# Patient Record
Sex: MALE | Race: WHITE | NOT HISPANIC OR LATINO | Employment: FULL TIME | URBAN - METROPOLITAN AREA
[De-identification: names, ages, dates, MRNs, and addresses within clinical notes are randomized per-mention and may not be internally consistent; named-entity substitution may affect disease eponyms.]

---

## 2023-03-27 ENCOUNTER — APPOINTMENT (OUTPATIENT)
Dept: RADIOLOGY | Facility: MEDICAL CENTER | Age: 18
End: 2023-03-27
Attending: EMERGENCY MEDICINE
Payer: COMMERCIAL

## 2023-03-27 ENCOUNTER — HOSPITAL ENCOUNTER (EMERGENCY)
Facility: MEDICAL CENTER | Age: 18
End: 2023-03-27
Attending: EMERGENCY MEDICINE
Payer: COMMERCIAL

## 2023-03-27 VITALS
DIASTOLIC BLOOD PRESSURE: 59 MMHG | HEIGHT: 76 IN | SYSTOLIC BLOOD PRESSURE: 142 MMHG | OXYGEN SATURATION: 97 % | TEMPERATURE: 97.8 F | WEIGHT: 199.3 LBS | HEART RATE: 72 BPM | BODY MASS INDEX: 24.27 KG/M2 | RESPIRATION RATE: 16 BRPM

## 2023-03-27 DIAGNOSIS — S60.212A CONTUSION OF LEFT WRIST, INITIAL ENCOUNTER: ICD-10-CM

## 2023-03-27 DIAGNOSIS — S60.222A CONTUSION OF LEFT HAND, INITIAL ENCOUNTER: ICD-10-CM

## 2023-03-27 PROCEDURE — 700102 HCHG RX REV CODE 250 W/ 637 OVERRIDE(OP): Performed by: EMERGENCY MEDICINE

## 2023-03-27 PROCEDURE — 73130 X-RAY EXAM OF HAND: CPT | Mod: LT

## 2023-03-27 PROCEDURE — 73100 X-RAY EXAM OF WRIST: CPT | Mod: LT

## 2023-03-27 PROCEDURE — A9270 NON-COVERED ITEM OR SERVICE: HCPCS | Performed by: EMERGENCY MEDICINE

## 2023-03-27 PROCEDURE — 99284 EMERGENCY DEPT VISIT MOD MDM: CPT

## 2023-03-27 RX ORDER — IBUPROFEN 600 MG/1
600 TABLET ORAL ONCE
Status: COMPLETED | OUTPATIENT
Start: 2023-03-27 | End: 2023-03-27

## 2023-03-27 RX ORDER — IBUPROFEN 800 MG/1
800 TABLET ORAL EVERY 8 HOURS PRN
Qty: 30 TABLET | Refills: 0 | Status: SHIPPED | OUTPATIENT
Start: 2023-03-27 | End: 2023-06-23

## 2023-03-27 RX ADMIN — IBUPROFEN 600 MG: 600 TABLET, FILM COATED ORAL at 20:49

## 2023-03-27 NOTE — LETTER
"  FORM C-4:  EMPLOYEE’S CLAIM FOR COMPENSATION/ REPORT OF INITIAL TREATMENT  EMPLOYEE’S CLAIM - PROVIDE ALL INFORMATION REQUESTED   First Name Prosper Last Name Nigel Birthdate 2005  Sex male Claim Number   Home Address [facility] [address]   [place]             Zip [address]                                   Age  17 y.o. Height  1.93 m (6' 4\") (99 %, Z= 2.31, Source: CDC (Boys, 2-20 Years)) Weight  90.4 kg (199 lb 4.7 oz) (92 %, Z= 1.44, Source: CDC (Boys, 2-20 Years)) [facility]  [id number]   Mailing Address [address]  [place]              Zip [address] Telephone  [phone] (home)  Primary Language Spoken  English   Insurer   Third Party   [employer] Employee's Occupation (Job Title) When Injury or Occupational Disease Occurred     Employer's Name [employer] Telephone [phone]    Employer Address [address] [facility] [place] [facility] [address]] Zip [address]   Date of Injury  3/27/2023       Hour of Injury  8:30 AM Date Employer Notified  3/27/2023 Last Day of Work after Injury or Occupational Disease  3/27/2023 Supervisor to Whom Injury Reported  Koffi   Address or Location of Accident (if applicable) Work [1]   What were you doing at the time of accident? (if applicable) N/A    How did this injury or occupational disease occur? Be specific and answer in detail. Use additional sheet if necessary)  Placed me in a area that wasnt part of the zone or a official station of the line and a conveyor that raises with no signs or warning crushed my hand as I was doing my work.   If you believe that you have an occupational disease, when did you first have knowledge of the disability and it relationship to your employment? N/A Witnesses to the Accident  N/A   Nature of Injury or Occupational Disease  Workers' Compensation Part(s) of Body Injured or Affected  Hand (L), N/A, N/A    I CERTIFY THAT THE ABOVE IS TRUE AND CORRECT TO THE BEST OF MY " KNOWLEDGE AND THAT I HAVE PROVIDED THIS INFORMATION IN ORDER TO OBTAIN THE BENEFITS OF [place] IN[place] OR CHAPTER 617 OF [place]).  I HEREBY AUTHORIZE ANY PHYSICIAN, CHIROPRACTOR, SURGEON, PRACTITIONER, OR OTHER PERSON, ANY HOSPITAL, INCLUDING [facility] OR [facility], ANY MEDICAL SERVICE ORGANIZATION, ANY INSURANCE COMPANY, OR OTHER INSTITUTION OR ORGANIZATION TO RELEASE TO EACH OTHER, ANY MEDICAL OR OTHER INFORMATION, INCLUDING BENEFITS PAID OR PAYABLE, PERTINENT TO THIS INJURY OR DISEASE, EXCEPT INFORMATION RELATIVE TO DIAGNOSIS, TREATMENT AND/OR COUNSELING FOR AIDS, PSYCHOLOGICAL CONDITIONS, ALCOHOL OR CONTROLLED SUBSTANCES, FOR WHICH I MUST GIVE SPECIFIC AUTHORIZATION.  A PHOTOSTAT OF THIS AUTHORIZATION SHALL BE AS VALID AS THE ORIGINAL.  Date   03/27/2023        [facility]              Employee’s Signature   THIS REPORT MUST BE COMPLETED AND MAILED WITHIN 3 WORKING DAYS OF TREATMENT   [facility], EMERGENCY DEPT                       Name of Facility [facility]   Date  3/27/2023 Diagnosis  (S60.222A) Contusion of left hand, initial encounter  (S60.212A) Contusion of left wrist, initial encounter Is there evidence the injured employee was under the influence of alcohol and/or another controlled substance at the time of accident?   Hour  8:38 PM Description of Injury or Disease  Contusion of left hand, initial encounter  Contusion of left wrist, initial encounter     Treatment  Wrist splint and Motrin  Have you advised the patient to remain off work five days or more?         No   X-Ray Findings  Negative If Yes   From Date    To Date      From information given by the employee, together with medical evidence, can you directly connect this injury or occupational disease as job incurred? Yes If No, is employee capable of: Full Duty  No Modified  "Duty  Yes   Is additional medical care by a physician indicated? Yes If Modified Duty, Specify any Limitations / Restrictions   No use of left hand or wrist until seen by Workmen's Comp.   Do you know of any previous injury or disease contributing to this condition or occupational disease? No    Date 3/27/2023 Print Doctor’s Name Dax Ayala I certify the employer’s copy of this form was mailed on:   Address [address] [place] [address] [phone] INSURER’S USE ONLY   Provider’s Tax ID Number [id number] Telephone Dept: [phone]    Doctor’s Signature e-DAX Jean-Baptiste D.O. Degree  D.O.      Form C-4 (rev.10/07)                                                           BRIEF DESCRIPTION OF RIGHTS AND BENEFITS  (Pursuant to [place]RS 616C.050)    Notice of Injury or Occupational Disease (Incident Report Form C-1): If an injury or occupational disease (OD) arises out of and in the course of employment, you must provide written notice to your employer as soon as practicable, but no later than 7 days after the accident or OD. Your employer shall maintain a sufficient supply of the required forms.    Claim for Compensation (Form C-4): If medical treatment is sought, the form C-4 is available at the place of initial treatment. A completed \"Claim for Compensation\" (Form C-4) must be filed within 90 days after an accident or OD. The treating physician or chiropractor must, within 3 working days after treatment, complete and mail to the employer, the employer's insurer and third-party , the Claim for Compensation.    Medical Treatment: If you require medical treatment for your on-the-job injury or OD, you may be required to select a physician or chiropractor from a list provided by your workers’ compensation insurer, if it has contracted with an Organization for Managed Care (MCO) or Preferred Provider Organization (PPO) or providers of health care. If your employer has not entered into a " contract with an MCO or PPO, you may select a physician or chiropractor from the Panel of Physicians and Chiropractors. Any medical costs related to your industrial injury or OD will be paid by your insurer.    Temporary Total Disability (TTD): If your doctor has certified that you are unable to work for a period of at least 5 consecutive days, or 5 cumulative days in a 20-day period, or places restrictions on you that your employer does not accommodate, you may be entitled to TTD compensation.    Temporary Partial Disability (TPD): If the wage you receive upon reemployment is less than the compensation for TTD to which you are entitled, the insurer may be required to pay you TPD compensation to make up the difference. TPD can only be paid for a maximum of 24 months.    Permanent Partial Disability (PPD): When your medical condition is stable and there is an indication of a PPD as a result of your injury or OD, within 30 days, your insurer must arrange for an evaluation by a rating physician or chiropractor to determine the degree of your PPD. The amount of your PPD award depends on the date of injury, the results of the PPD evaluation, your age and wage.    Permanent Total Disability (PTD): If you are medically certified by a treating physician or chiropractor as permanently and totally disabled and have been granted a PTD status by your insurer, you are entitled to receive monthly benefits not to exceed 66 2/3% of your average monthly wage. The amount of your PTD payments is subject to reduction if you previously received a lump-sum PPD award.    Vocational Rehabilitation Services: You may be eligible for vocational rehabilitation services if you are unable to return to the job due to a permanent physical impairment or permanent restrictions as a result of your injury or occupational disease.    Transportation and Per Mann Reimbursement: You may be eligible for travel expenses and per mann associated with medical  treatment.    Reopening: You may be able to reopen your claim if your condition worsens after claim closure.     Appeal Process: If you disagree with a written determination issued by the insurer or the insurer does not respond to your request, you may appeal to the Department of Administration, , by following the instructions contained in your determination letter. You must appeal the determination within 70 days from the date of the determination letter at [address], [address], [place], [place] [address], or [address], [place], [place] [address]. If you disagree with the  decision, you may appeal to the Department of Administration, . You must file your appeal within 30 days from the date of the  decision letter at [address], [address], [place], [place] [address], or [address], [place], [place] [address]. If you disagree with a decision of an , you may file a petition for judicial review with the District Court. You must do so within 30 days of the Appeal Officer’s decision. You may be represented by an  at your own expense or you may contact the [facility] for possible representation.    [place]  for Injured Workers (NAIW): If you disagree with a  decision, you may request that NAIW represent you without charge at an  Hearing. For information regarding denial of benefits, you may contact the [facility] at: [address], [address], [place] [address], [phone], or [address], [address], [place] [address], [phone]    To File a Complaint with the Division: If you wish to file a complaint with the  of the Division of Industrial Relations (DIR),  please contact the Workers’ Compensation Section, [address], [address], [place], [place] [address], telephone [phone], or [address], [address], [place], [place] [address], telephone [phone].    For assistance with Workers’ Compensation Issues: You may contact the [facility] Office for Consumer Health Assistance, [facility], [address], [place], [place] [address], Toll Free 1-145.611.5542, Web site: http://[facility].nv.gov/Programs/LILY E-mail: lily@[facility].nv.gov  D-2 (rev. 10/20)                  __________________________________________________________________                                    _________________            Employee Name / Signature                                                                                                                            Date

## 2023-03-28 NOTE — ED TRIAGE NOTES
"Chief Complaint   Patient presents with    Hand Pain     Pt crushed left hand under machine at work at 8 am this morning. Pt complaining of 7/10 pain to left hand and wrist. +swelling, CMS intact.        16 yo M to triage for above complaint. XRAY ordered.      Pt placed in lobby. Pt educated on triage process. Pt encouraged to alert staff for any changes.     Patient and staff wearing appropriate PPE    BP (!) 140/60   Pulse 71   Temp 36.7 °C (98.1 °F) (Temporal)   Resp 16   Ht 1.93 m (6' 4\")   Wt 90.4 kg (199 lb 4.7 oz)   SpO2 98%   BMI 24.26 kg/m²     "

## 2023-03-28 NOTE — ED PROVIDER NOTES
"ED Provider Note    CHIEF COMPLAINT  Chief Complaint   Patient presents with    Hand Pain     Pt crushed left hand under machine at work at 8 am this morning. Pt complaining of 7/10 pain to left hand and wrist. +swelling, CMS intact.        EXTERNAL RECORDS REVIEWED  None    HPI/ROS  LIMITATION TO HISTORY   None  OUTSIDE HISTORIAN(S):  None    Prosper Manning is a 17 y.o. male who presents here for evaluation of left wrist and hand pain.  Patient states he was at work, and he was on a conveyor belt station, when the belt pulled his hand in towards the machine, but he was able to pull the hand out.  He sustained a hand and wrist injury on the left side.  Nothing fell onto his hand.  He did this earlier this morning, but came here for further evaluation.  He has no fever chills or vomiting, and no other reported injuries.  He has not taken anything prior to arrival for any pain or discomfort.    PAST MEDICAL HISTORY   No bleeding disorders    SURGICAL HISTORY  patient denies any surgical history    FAMILY HISTORY  History reviewed. No pertinent family history.    SOCIAL HISTORY  Social History     Tobacco Use    Smoking status: Never    Smokeless tobacco: Never   Vaping Use    Vaping Use: Never used   Substance and Sexual Activity    Alcohol use: Never    Drug use: Never    Sexual activity: Not on file       CURRENT MEDICATIONS  Home Medications       Reviewed by Sandra Dickens RMARI (Registered Nurse) on 03/27/23 at 1954  Med List Status: Not Addressed     Medication Last Dose Status        Patient Sen Taking any Medications                           ALLERGIES  No Known Allergies    PHYSICAL EXAM  VITAL SIGNS: BP (!) 140/60   Pulse 71   Temp 36.7 °C (98.1 °F) (Temporal)   Resp 16   Ht 1.93 m (6' 4\")   Wt 90.4 kg (199 lb 4.7 oz)   SpO2 98%   BMI 24.26 kg/m²    Constitutional: Well developed, well nourished. No acute distress.  HEENT: Normocephalic, atraumatic. Posterior pharynx clear and moist.  Eyes:  EOMI. " Normal sclera.  Neck: Supple, Full range of motion, nontender.  Musculoskeletal: No deformity, no edema, neurovascular intact.  Left upper extremity; tenderness to the distal lateral wrist, and palmar aspect of the hand.  Neurovascular intact distally.  Good cap refill.  Nontender left elbow, all soft compartments.  Neuro: Clear speech, appropriate, cooperative, cranial nerves II-XII grossly intact.  Psych: Normal mood and affect      DIAGNOSTIC STUDIES / PROCEDURES  None    RADIOLOGY  I have independently interpreted the diagnostic imaging associated with this visit and am waiting the final reading from the radiologist.   My preliminary interpretation is as follows: See below  Radiologist interpretation:   DX-WRIST-LIMITED 2- LEFT   Final Result         1.  No radiographic evidence of acute traumatic injury.      DX-HAND 3+ LEFT   Final Result         1.  No acute traumatic bony injury.            COURSE & MEDICAL DECISION MAKING  None    INITIAL ASSESSMENT, COURSE AND PLAN  Care Narrative: This is a 17-year-old male here for evaluation of left wrist and hand pain.  Patient had an incident at work where his hand was pulled into the conveyor belt area, and pulled upward.  He did not sustain any type of crush injury.  He has no acute finding on x-ray, and is neurovascular intact distally.  He will be placed in a wrist splint, and he will follow-up with Workmen's Comp.        DISPOSITION AND DISCUSSIONS  I have discussed management of the patient with the following physicians and ERIKA's: None    Discussion of management with other [facility] or appropriate source(s): None    Escalation of care considered, and ultimately not performed: None    Barriers to care at this time, including but not limited to: None.     Decision tools and prescription drugs considered including, but not limited to: None.    FINAL DIAGNOSIS  Left wrist contusion  Left hand contusion       Electronically signed by: Dax Ayala D.O., 3/27/2023  8:22 PM

## 2023-06-23 ENCOUNTER — OCCUPATIONAL MEDICINE (OUTPATIENT)
Dept: URGENT CARE | Facility: CLINIC | Age: 18
End: 2023-06-23
Payer: COMMERCIAL

## 2023-06-23 VITALS
HEIGHT: 76 IN | WEIGHT: 200 LBS | DIASTOLIC BLOOD PRESSURE: 70 MMHG | HEART RATE: 71 BPM | OXYGEN SATURATION: 97 % | RESPIRATION RATE: 12 BRPM | TEMPERATURE: 98.7 F | BODY MASS INDEX: 24.36 KG/M2 | SYSTOLIC BLOOD PRESSURE: 110 MMHG

## 2023-06-23 DIAGNOSIS — S66.912D HAND STRAIN, LEFT, SUBSEQUENT ENCOUNTER: ICD-10-CM

## 2023-06-23 PROCEDURE — 3074F SYST BP LT 130 MM HG: CPT | Performed by: NURSE PRACTITIONER

## 2023-06-23 PROCEDURE — 3078F DIAST BP <80 MM HG: CPT | Performed by: NURSE PRACTITIONER

## 2023-06-23 PROCEDURE — 99203 OFFICE O/P NEW LOW 30 MIN: CPT | Performed by: NURSE PRACTITIONER

## 2023-06-23 ASSESSMENT — ENCOUNTER SYMPTOMS: TINGLING: 0

## 2023-06-23 NOTE — LETTER
[facility] [address] [place] [address]  Phone:  [phone] - Fax:  [phone]   [facility] Progress Report and Disability Certification  Date of Service: 6/23/2023   No Show:  No  Date / Time of Next Visit: 6/30/2023   Claim Information   Patient Name: Prosper Manning  Claim Number:     Employer: [employer]  Date of Injury: 3/27/2023     Insurer / TPA: [employer]  ID / SSN:     Occupation:   Diagnosis: The encounter diagnosis was Hand strain, left, subsequent encounter.    Medical Information   Related to Industrial Injury? Yes    Subjective Complaints:  DOI: 3/27/23: Patient is a 17-year-old male who returns to the urgent care for reevaluation of left hand discomfort that is been exacerbated while at work.  On date of injury patient was injured with a crushing injury to the left hand was not initially evaluated in the emergency room, x-rays were performed which were negative for any acute fractures.  He was provided a brace which she did wear for a few weeks following the injury.  He was placed on light duty initially however has been back to work full duty.  Patient has yet to be seen since the emergency room visit for an evaluation.  Pain is exacerbated with repetitive heavy lifting motions.  He has no acute bony tenderness.  Has no difficulty moving left hand.  Has no numbness or tingling.  Patient is right-hand dominant.   Objective Findings: Left hand: No gross deformities, skin without erythema, no edema, no ecchymosis.  Nontender to palpation.+AROM, sensation intact distally, neurovascular intact.  Finkelstein negative, Tinel's and Phalen's negative.  Left wrist: No gross forms, skin without erythema, no edema, no ecchymosis. +AROM, no snuffbox tenderness.   Pre-Existing Condition(s):     Assessment:   Condition Improved    Status: Additional Care Required  Permanent Disability:No    Plan:      Diagnostics:       Comments:       Disability Information   Status: Released to Restricted Duty    From:  2023  Through: 2023 Restrictions are: Temporary   Physical Restrictions   Sitting:    Standing:    Stooping:    Bending:      Squatting:    Walking:    Climbing:    Pushin hrs/day   Pulling:    Other:    Reaching Above Shoulder (L):   Reaching Above Shoulder (R):       Reaching Below Shoulder (L):    Reaching Below Shoulder (R):      Not to exceed Weight Limits   Carrying(hrs):   Weight Limit(lb): < or = to 10 pounds Lifting(hrs):   Weight  Limit(lb): < or = to 10 pounds   Comments: Encourage patient to rest, ice, Tyle Motrin as needed for pain.  Pain has been exacerbated with continual use encouraged to wear brace, will place on temporary work restrictions.  Patient will follow-up in 1 week for reevaluation.    Repetitive Actions   Hands: i.e. Fine Manipulations from [place] hrs/day   Feet: i.e. Operating Foot Controls:     Driving / Operate Machinery:     Health Care Provider’s Original or Electronic Signature  Jovan Byrne A.P.R.NMello Health Care Provider’s Original or Electronic Signature    Binu Hernandez DO MPH     Clinic Name / Location: [address] [facility]  [place] [address] Clinic Phone Number: Dept: [phone]   Appointment Time: 6:15 Pm Visit Start Time: 6:32 PM   Check-In Time:  6:20 Pm Visit Discharge Time:  6:39 PM    Original-Treating Physician or Chiropractor    Page 2-Insurer/TPA    Page 3-Employer    Page 4-Employee

## 2023-06-24 NOTE — PROGRESS NOTES
"Subjective:   Prosper Manning  is a 17 y.o. male who presents for Wound Check (Pt here for wound check on (L) wrist, hand)    DOI: 3/27/23: Patient is a 17-year-old male who returns to the urgent care for reevaluation of left hand discomfort that is been exacerbated while at work.  On date of injury patient was injured with a crushing injury to the left hand was not initially evaluated in the emergency room, x-rays were performed which were negative for any acute fractures.  He was provided a brace which she did wear for a few weeks following the injury.  He was placed on light duty initially however has been back to work full duty.  Patient has yet to be seen since the emergency room visit for an evaluation.  Pain is exacerbated with repetitive heavy lifting motions.  He has no acute bony tenderness.  Has no difficulty moving left hand.  Has no numbness or tingling.  Patient is right-hand dominant.   HPI  Review of Systems   Musculoskeletal:  Positive for joint pain.   Neurological:  Negative for tingling.     No Known Allergies   Objective:   /70 (BP Location: Left arm, Patient Position: Sitting, BP Cuff Size: Large adult)   Pulse 71   Temp 37.1 °C (98.7 °F) (Temporal)   Resp 12   Ht 1.93 m (6' 4\")   Wt 90.7 kg (200 lb)   SpO2 97%   BMI 24.34 kg/m²   Physical Exam  Constitutional:       Appearance: Normal appearance. He is not ill-appearing or toxic-appearing.   HENT:      Head: Normocephalic.      Right Ear: External ear normal.      Left Ear: External ear normal.      Nose: Nose normal.      Mouth/Throat:      Lips: Pink.   Eyes:      General: Lids are normal.   Pulmonary:      Effort: Pulmonary effort is normal. No accessory muscle usage.   Musculoskeletal:      Left hand: No swelling, tenderness or bony tenderness. Normal range of motion. There is no disruption of two-point discrimination. Normal capillary refill. Normal pulse.      Cervical back: Full passive range of motion without pain. "   Neurological:      Mental Status: He is alert and oriented to person, place, and time.   Psychiatric:         Mood and Affect: Mood normal.         Thought Content: Thought content normal.       Left hand: No gross deformities, skin without erythema, no edema, no ecchymosis.  Nontender to palpation.+AROM, sensation intact distally, neurovascular intact.  Finkelstein negative, Tinel's and Phalen's negative.  Left wrist: No gross forms, skin without erythema, no edema, no ecchymosis. +AROM, no snuffbox tenderness.   Assessment/Plan:   1. Hand strain, left, subsequent encounter  Encourage patient to rest, ice, Tyle Motrin as needed for pain.  Pain has been exacerbated with continual use encouraged to wear brace, will place on temporary work restrictions.  Patient will follow-up in 1 week for reevaluation.  Differential diagnosis, natural history, supportive care, and indications for immediate follow-up discussed.

## 2023-07-07 ENCOUNTER — APPOINTMENT (OUTPATIENT)
Dept: RADIOLOGY | Facility: IMAGING CENTER | Age: 18
End: 2023-07-07
Attending: PHYSICIAN ASSISTANT
Payer: COMMERCIAL

## 2023-07-07 ENCOUNTER — OCCUPATIONAL MEDICINE (OUTPATIENT)
Dept: URGENT CARE | Facility: CLINIC | Age: 18
End: 2023-07-07
Payer: COMMERCIAL

## 2023-07-07 VITALS
BODY MASS INDEX: 24.27 KG/M2 | OXYGEN SATURATION: 96 % | DIASTOLIC BLOOD PRESSURE: 68 MMHG | TEMPERATURE: 98.6 F | SYSTOLIC BLOOD PRESSURE: 122 MMHG | HEART RATE: 91 BPM | RESPIRATION RATE: 16 BRPM | WEIGHT: 199.3 LBS | HEIGHT: 76 IN

## 2023-07-07 DIAGNOSIS — S66.912D HAND STRAIN, LEFT, SUBSEQUENT ENCOUNTER: ICD-10-CM

## 2023-07-07 PROCEDURE — 3078F DIAST BP <80 MM HG: CPT | Performed by: PHYSICIAN ASSISTANT

## 2023-07-07 PROCEDURE — 3074F SYST BP LT 130 MM HG: CPT | Performed by: PHYSICIAN ASSISTANT

## 2023-07-07 PROCEDURE — 73140 X-RAY EXAM OF FINGER(S): CPT | Mod: TC,LT | Performed by: RADIOLOGY

## 2023-07-07 PROCEDURE — 99213 OFFICE O/P EST LOW 20 MIN: CPT | Performed by: PHYSICIAN ASSISTANT

## 2023-07-07 RX ORDER — DEXTROAMPHETAMINE SACCHARATE, AMPHETAMINE ASPARTATE MONOHYDRATE, DEXTROAMPHETAMINE SULFATE AND AMPHETAMINE SULFATE 5; 5; 5; 5 MG/1; MG/1; MG/1; MG/1
CAPSULE, EXTENDED RELEASE ORAL
COMMUNITY
Start: 2023-07-01

## 2023-07-07 NOTE — LETTER
[facility] [address] [place] [address]  Phone:  [phone] - Fax:  [phone]   [facility] Progress Report and Disability Certification  Date of Service: 7/7/2023   No Show:  No  Date / Time of Next Visit:  Within 2 weeks with occupational medicine   Claim Information   Patient Name: Prosper Manning  Claim Number:     Employer: [employer]  Date of Injury: 3/27/2023     Insurer / TPA: [employer]  ID / SSN:     Occupation:   Diagnosis: The encounter diagnosis was Hand strain, left, subsequent encounter.    Medical Information   Related to Industrial Injury? Yes    Subjective Complaints:  DOI: 3/27/2023.  Ongoing left hand pain and swelling.  Pain is localized to the third digit.  Worse with lifting and grabbing.  There is intermittent swelling.  Pain does radiate into the wrist.  No range of motion deficit.  Some intermittent numbness but no weakness.  He has been wearing brace and using OTC meds.  He has been on full duty.   Objective Findings: TTP between the PIP and MP joint of the left third digit.  Pain does extend into his metacarpal and wrist.  There is no gross deformity, swelling or malalignment.  Range of motion and  strength equal.   Pre-Existing Condition(s):     Assessment:   Condition Same    Status: Additional Care Required  Permanent Disability:No    Plan: Medication    Diagnostics: X-ray  Comments:Repeat x-ray negative    Comments:       Disability Information   Status: Released to Full Duty    From:     Through:   Restrictions are:     Physical Restrictions   Sitting:    Standing:    Stooping:    Bending:      Squatting:    Walking:    Climbing:    Pushing:      Pulling:    Other:    Reaching Above Shoulder (L):   Reaching Above Shoulder (R):       Reaching Below Shoulder (L):    Reaching Below Shoulder (R):      Not to exceed Weight Limits   Carrying(hrs):   Weight Limit(lb):   Lifting(hrs):   Weight   Limit(lb):     Comments: Must wear brace at work    Repetitive Actions   Hands: i.e. Fine Manipulations from Grasping:     Feet: i.e. Operating Foot Controls:     Driving / Operate Machinery:     Health Care Provider’s Original or Electronic Signature  Sánchez Alfaro P.A.-C. Health Care Provider’s Original or Electronic Signature    Binu Hernandez DO MPH     Clinic Name / Location: [place] [address]  [place] [address] Clinic Phone Number: Dept: [phone]   Appointment Time: 2:30 Pm Visit Start Time: 2:53 PM   Check-In Time:  2:40 Pm Visit Discharge Time:     Original-Treating Physician or Chiropractor    Page 2-Insurer/TPA    Page 3-Employer    Page 4-Employee

## 2023-07-07 NOTE — PROGRESS NOTES
"Subjective     Prosper Manning is a 17 y.o. male who presents with Follow-Up (Left hand )      DOI: 3/27/2023.  Ongoing left hand pain and swelling.  Pain is localized to the third digit.  Worse with lifting and grabbing.  There is intermittent swelling.  Pain does radiate into the wrist.  No range of motion deficit.  Some intermittent numbness but no weakness.  He has been wearing brace and using OTC meds.  He has been on full duty.     HPI    ROS           Objective     /68   Pulse 91   Temp 37 °C (98.6 °F) (Temporal)   Resp 16   Ht 1.93 m (6' 4\")   Wt 90.4 kg (199 lb 4.8 oz)   SpO2 96%   BMI 24.26 kg/m²      Physical Exam    TTP between the PIP and MP joint of the left third digit.  Pain does extend into his metacarpal and wrist.  There is no gross deformity, swelling or malalignment.  Range of motion and  strength equal.                   Assessment & Plan        1. Hand strain, left, subsequent encounter  DX-FINGER(S) 2+ LEFT        No improvement after 4 months.  Still having pain and intermittent swelling.  Continue to wear brace.  RICE therapy.  Further follow-up will be with occupational health.  May need further imaging or physical therapy.    Please note that this dictation was created using voice recognition software. I have made every reasonable attempt to correct obvious errors, but I expect that there are errors of grammar and possibly content that I did not discover before finalizing the note.          "